# Patient Record
Sex: MALE | URBAN - METROPOLITAN AREA
[De-identification: names, ages, dates, MRNs, and addresses within clinical notes are randomized per-mention and may not be internally consistent; named-entity substitution may affect disease eponyms.]

---

## 2022-10-13 ENCOUNTER — TELEPHONE (OUTPATIENT)
Dept: ENT CLINIC | Age: 32
End: 2022-10-13

## 2022-10-21 ENCOUNTER — TELEPHONE (OUTPATIENT)
Dept: ENT CLINIC | Age: 32
End: 2022-10-21

## 2022-10-21 NOTE — TELEPHONE ENCOUNTER
LVM informing pt that appt scheduled 11/11 needs to be rescheduled due to [place] being out of the office. Informed pt that appts are available the following Monday with Dr. Pelon Johnson, if preferred, since this pt has already been rescheduled once.  Asked pt to contact the office as soon as possible to r/s